# Patient Record
Sex: MALE | Race: WHITE | URBAN - METROPOLITAN AREA
[De-identification: names, ages, dates, MRNs, and addresses within clinical notes are randomized per-mention and may not be internally consistent; named-entity substitution may affect disease eponyms.]

---

## 2023-04-26 ENCOUNTER — APPOINTMENT (OUTPATIENT)
Dept: URBAN - METROPOLITAN AREA CLINIC 235 | Age: 33
Setting detail: DERMATOLOGY
End: 2023-04-28

## 2023-04-26 DIAGNOSIS — L63.8 OTHER ALOPECIA AREATA: ICD-10-CM

## 2023-04-26 DIAGNOSIS — L70.0 ACNE VULGARIS: ICD-10-CM

## 2023-04-26 PROCEDURE — OTHER PRESCRIPTION: OTHER

## 2023-04-26 PROCEDURE — OTHER REFUSAL OF TREATMENT: OTHER

## 2023-04-26 PROCEDURE — 99204 OFFICE O/P NEW MOD 45 MIN: CPT

## 2023-04-26 PROCEDURE — OTHER MIPS QUALITY: OTHER

## 2023-04-26 PROCEDURE — OTHER COUNSELING: OTHER

## 2023-04-26 PROCEDURE — OTHER TREATMENT REGIMEN: OTHER

## 2023-04-26 RX ORDER — MINOCYCLINE 40 MG/G
AEROSOL, FOAM TOPICAL
Qty: 30 | Refills: 5 | Status: ERX | COMMUNITY
Start: 2023-04-26

## 2023-04-26 ASSESSMENT — LOCATION SIMPLE DESCRIPTION DERM
LOCATION SIMPLE: POSTERIOR SCALP
LOCATION SIMPLE: CHIN
LOCATION SIMPLE: POSTERIOR NECK
LOCATION SIMPLE: CHEST
LOCATION SIMPLE: RIGHT UPPER ARM
LOCATION SIMPLE: LEFT UPPER ARM
LOCATION SIMPLE: RIGHT UPPER BACK

## 2023-04-26 ASSESSMENT — LOCATION DETAILED DESCRIPTION DERM
LOCATION DETAILED: LEFT MEDIAL INFERIOR CHEST
LOCATION DETAILED: MID POSTERIOR NECK
LOCATION DETAILED: RIGHT CHIN
LOCATION DETAILED: RIGHT MEDIAL UPPER BACK
LOCATION DETAILED: MID-OCCIPITAL SCALP
LOCATION DETAILED: LEFT PROXIMAL POSTERIOR UPPER ARM
LOCATION DETAILED: RIGHT PROXIMAL POSTERIOR UPPER ARM

## 2023-04-26 ASSESSMENT — LOCATION ZONE DERM
LOCATION ZONE: TRUNK
LOCATION ZONE: SCALP
LOCATION ZONE: FACE
LOCATION ZONE: NECK
LOCATION ZONE: ARM

## 2023-04-26 ASSESSMENT — SEVERITY ASSESSMENT OVERALL AMONG ALL PATIENTS
IN YOUR EXPERIENCE, AMONG ALL PATIENTS YOU HAVE SEEN WITH THIS CONDITION, HOW SEVERE IS THIS PATIENT'S CONDITION?: S1 (LESS THAN 25% HAIR LOSS)

## 2023-04-26 NOTE — PROCEDURE: TREATMENT REGIMEN
Initiate Treatment: - Amzeeq 4 % topical foam; Apply to affected areas on the body once daily in morning. Rub in for 30 seconds.
Continue Regimen: - washing body with BPO wash daily, leaving on several minutes then rinsing off
Detail Level: Zone

## 2023-04-26 NOTE — HPI: ANDROGENIC ALOPECIA
Is This A New Presentation, Or A Follow-Up?: Hair Loss
How Did The Hair Loss Occur?: sudden in onset